# Patient Record
Sex: FEMALE | Race: BLACK OR AFRICAN AMERICAN | NOT HISPANIC OR LATINO
[De-identification: names, ages, dates, MRNs, and addresses within clinical notes are randomized per-mention and may not be internally consistent; named-entity substitution may affect disease eponyms.]

---

## 2017-04-07 ENCOUNTER — APPOINTMENT (OUTPATIENT)
Dept: INTERNAL MEDICINE | Facility: CLINIC | Age: 82
End: 2017-04-07

## 2017-04-12 ENCOUNTER — APPOINTMENT (OUTPATIENT)
Dept: INTERNAL MEDICINE | Facility: CLINIC | Age: 82
End: 2017-04-12

## 2017-04-12 ENCOUNTER — RX RENEWAL (OUTPATIENT)
Age: 82
End: 2017-04-12

## 2017-04-12 VITALS — DIASTOLIC BLOOD PRESSURE: 96 MMHG | SYSTOLIC BLOOD PRESSURE: 162 MMHG | RESPIRATION RATE: 12 BRPM | HEART RATE: 80 BPM

## 2017-04-12 VITALS — WEIGHT: 131 LBS | HEIGHT: 65.5 IN | BODY MASS INDEX: 21.56 KG/M2

## 2017-04-12 VITALS — SYSTOLIC BLOOD PRESSURE: 160 MMHG | DIASTOLIC BLOOD PRESSURE: 96 MMHG

## 2017-04-12 DIAGNOSIS — R60.0 LOCALIZED EDEMA: ICD-10-CM

## 2017-04-12 DIAGNOSIS — L30.9 DERMATITIS, UNSPECIFIED: ICD-10-CM

## 2017-04-12 DIAGNOSIS — L29.9 PRURITUS, UNSPECIFIED: ICD-10-CM

## 2017-04-12 DIAGNOSIS — I11.9 HYPERTENSIVE HEART DISEASE W/OUT HEART FAILURE: ICD-10-CM

## 2017-04-12 DIAGNOSIS — Z00.00 ENCOUNTER FOR GENERAL ADULT MEDICAL EXAMINATION W/OUT ABNORMAL FINDINGS: ICD-10-CM

## 2017-04-12 DIAGNOSIS — Z80.3 FAMILY HISTORY OF MALIGNANT NEOPLASM OF BREAST: ICD-10-CM

## 2017-04-12 DIAGNOSIS — I48.91 UNSPECIFIED ATRIAL FIBRILLATION: ICD-10-CM

## 2017-04-12 DIAGNOSIS — I50.9 HEART FAILURE, UNSPECIFIED: ICD-10-CM

## 2017-04-12 RX ORDER — ASPIRIN ENTERIC COATED TABLETS 81 MG 81 MG/1
81 TABLET, DELAYED RELEASE ORAL DAILY
Qty: 30 | Refills: 0 | Status: ACTIVE | COMMUNITY
Start: 2017-04-12

## 2017-04-12 RX ORDER — CARVEDILOL 6.25 MG/1
6.25 TABLET, FILM COATED ORAL TWICE DAILY
Qty: 180 | Refills: 3 | Status: ACTIVE | COMMUNITY
Start: 2017-03-13

## 2017-04-12 RX ORDER — RAMIPRIL 1.25 MG/1
1.25 CAPSULE ORAL DAILY
Qty: 90 | Refills: 0 | Status: ACTIVE | COMMUNITY

## 2017-04-13 ENCOUNTER — RX RENEWAL (OUTPATIENT)
Age: 82
End: 2017-04-13

## 2017-04-13 LAB
ALBUMIN SERPL ELPH-MCNC: 4.1 G/DL
ALP BLD-CCNC: 67 U/L
ALT SERPL-CCNC: 10 U/L
ANION GAP SERPL CALC-SCNC: 15 MMOL/L
AST SERPL-CCNC: 20 U/L
BASOPHILS # BLD AUTO: 0.05 K/UL
BASOPHILS NFR BLD AUTO: 1 %
BILIRUB DIRECT SERPL-MCNC: 0.1 MG/DL
BILIRUB INDIRECT SERPL-MCNC: 0.2 MG/DL
BILIRUB SERPL-MCNC: 0.2 MG/DL
BUN SERPL-MCNC: 17 MG/DL
CALCIUM SERPL-MCNC: 9.4 MG/DL
CHLORIDE SERPL-SCNC: 104 MMOL/L
CHOLEST SERPL-MCNC: 181 MG/DL
CHOLEST/HDLC SERPL: 2.8 RATIO
CO2 SERPL-SCNC: 22 MMOL/L
CREAT SERPL-MCNC: 0.76 MG/DL
EOSINOPHIL # BLD AUTO: 0.39 K/UL
EOSINOPHIL NFR BLD AUTO: 7.9 %
GLUCOSE SERPL-MCNC: 83 MG/DL
HBA1C MFR BLD HPLC: 6.1 %
HCT VFR BLD CALC: 36.6 %
HDLC SERPL-MCNC: 65 MG/DL
HGB BLD-MCNC: 11.1 G/DL
IMM GRANULOCYTES NFR BLD AUTO: 0.2 %
INR PPP: 1.1 RATIO
LDLC SERPL CALC-MCNC: 103 MG/DL
LYMPHOCYTES # BLD AUTO: 1.61 K/UL
LYMPHOCYTES NFR BLD AUTO: 32.7 %
MAN DIFF?: NORMAL
MCHC RBC-ENTMCNC: 27.2 PG
MCHC RBC-ENTMCNC: 30.3 GM/DL
MCV RBC AUTO: 89.7 FL
MONOCYTES # BLD AUTO: 0.4 K/UL
MONOCYTES NFR BLD AUTO: 8.1 %
NEUTROPHILS # BLD AUTO: 2.47 K/UL
NEUTROPHILS NFR BLD AUTO: 50.1 %
PLATELET # BLD AUTO: 244 K/UL
POTASSIUM SERPL-SCNC: 4.4 MMOL/L
PROT SERPL-MCNC: 7.8 G/DL
PT BLD: 12.4 SEC
RBC # BLD: 4.08 M/UL
RBC # FLD: 16.2 %
SODIUM SERPL-SCNC: 141 MMOL/L
TRIGL SERPL-MCNC: 64 MG/DL
TSH SERPL-ACNC: 4.48 UIU/ML
WBC # FLD AUTO: 4.93 K/UL

## 2017-04-13 RX ORDER — WARFARIN 2 MG/1
2 TABLET ORAL DAILY
Qty: 45 | Refills: 0 | Status: ACTIVE | COMMUNITY
Start: 2017-04-12

## 2017-07-28 ENCOUNTER — APPOINTMENT (OUTPATIENT)
Dept: INTERNAL MEDICINE | Facility: CLINIC | Age: 82
End: 2017-07-28

## 2018-04-23 ENCOUNTER — RX RENEWAL (OUTPATIENT)
Age: 83
End: 2018-04-23

## 2018-04-23 RX ORDER — FUROSEMIDE 40 MG/1
40 TABLET ORAL
Qty: 90 | Refills: 0 | Status: ACTIVE | COMMUNITY
Start: 2017-04-12 | End: 1900-01-01

## 2018-08-08 ENCOUNTER — INPATIENT (INPATIENT)
Facility: HOSPITAL | Age: 83
LOS: 2 days | Discharge: HOME | End: 2018-08-11
Attending: INTERNAL MEDICINE | Admitting: INTERNAL MEDICINE
Payer: COMMERCIAL

## 2018-08-08 VITALS
DIASTOLIC BLOOD PRESSURE: 85 MMHG | RESPIRATION RATE: 18 BRPM | HEART RATE: 79 BPM | OXYGEN SATURATION: 100 % | SYSTOLIC BLOOD PRESSURE: 147 MMHG | TEMPERATURE: 97 F

## 2018-08-08 LAB
ALBUMIN SERPL ELPH-MCNC: 3.7 G/DL — SIGNIFICANT CHANGE UP (ref 3.3–5)
ALP SERPL-CCNC: 72 U/L — SIGNIFICANT CHANGE UP (ref 40–120)
ALT FLD-CCNC: 20 U/L — SIGNIFICANT CHANGE UP (ref 12–78)
AMYLASE P1 CFR SERPL: 80 U/L — SIGNIFICANT CHANGE UP (ref 25–115)
ANION GAP SERPL CALC-SCNC: 9 MMOL/L — SIGNIFICANT CHANGE UP (ref 5–17)
APPEARANCE UR: CLEAR — SIGNIFICANT CHANGE UP
APTT BLD: 33.1 SEC — SIGNIFICANT CHANGE UP (ref 27.5–37.4)
AST SERPL-CCNC: 25 U/L — SIGNIFICANT CHANGE UP (ref 15–37)
BASOPHILS # BLD AUTO: 0.06 K/UL — SIGNIFICANT CHANGE UP (ref 0–0.2)
BASOPHILS NFR BLD AUTO: 0.9 % — SIGNIFICANT CHANGE UP (ref 0–2)
BILIRUB SERPL-MCNC: 0.3 MG/DL — SIGNIFICANT CHANGE UP (ref 0.2–1.2)
BILIRUB UR-MCNC: NEGATIVE — SIGNIFICANT CHANGE UP
BUN SERPL-MCNC: 20 MG/DL — SIGNIFICANT CHANGE UP (ref 7–23)
CALCIUM SERPL-MCNC: 8.8 MG/DL — SIGNIFICANT CHANGE UP (ref 8.5–10.1)
CHLORIDE SERPL-SCNC: 105 MMOL/L — SIGNIFICANT CHANGE UP (ref 96–108)
CK MB BLD-MCNC: 0.8 % — SIGNIFICANT CHANGE UP (ref 0–3.5)
CK MB CFR SERPL CALC: 1.4 NG/ML — SIGNIFICANT CHANGE UP (ref 0.5–3.6)
CK SERPL-CCNC: 174 U/L — SIGNIFICANT CHANGE UP (ref 26–192)
CO2 SERPL-SCNC: 26 MMOL/L — SIGNIFICANT CHANGE UP (ref 22–31)
COLOR SPEC: YELLOW — SIGNIFICANT CHANGE UP
CREAT SERPL-MCNC: 0.99 MG/DL — SIGNIFICANT CHANGE UP (ref 0.5–1.3)
DIFF PNL FLD: NEGATIVE — SIGNIFICANT CHANGE UP
EOSINOPHIL # BLD AUTO: 0.25 K/UL — SIGNIFICANT CHANGE UP (ref 0–0.5)
EOSINOPHIL NFR BLD AUTO: 3.9 % — SIGNIFICANT CHANGE UP (ref 0–6)
GLUCOSE SERPL-MCNC: 104 MG/DL — HIGH (ref 70–99)
GLUCOSE UR QL: NEGATIVE MG/DL — SIGNIFICANT CHANGE UP
HCT VFR BLD CALC: 36.3 % — SIGNIFICANT CHANGE UP (ref 34.5–45)
HGB BLD-MCNC: 11.1 G/DL — LOW (ref 11.5–15.5)
IMM GRANULOCYTES NFR BLD AUTO: 0.3 % — SIGNIFICANT CHANGE UP (ref 0–1.5)
INR BLD: 1.18 RATIO — HIGH (ref 0.88–1.16)
KETONES UR-MCNC: NEGATIVE — SIGNIFICANT CHANGE UP
LEUKOCYTE ESTERASE UR-ACNC: ABNORMAL
LIDOCAIN IGE QN: 235 U/L — SIGNIFICANT CHANGE UP (ref 73–393)
LYMPHOCYTES # BLD AUTO: 2.27 K/UL — SIGNIFICANT CHANGE UP (ref 1–3.3)
LYMPHOCYTES # BLD AUTO: 35.1 % — SIGNIFICANT CHANGE UP (ref 13–44)
MCHC RBC-ENTMCNC: 27.5 PG — SIGNIFICANT CHANGE UP (ref 27–34)
MCHC RBC-ENTMCNC: 30.6 GM/DL — LOW (ref 32–36)
MCV RBC AUTO: 90.1 FL — SIGNIFICANT CHANGE UP (ref 80–100)
MONOCYTES # BLD AUTO: 0.74 K/UL — SIGNIFICANT CHANGE UP (ref 0–0.9)
MONOCYTES NFR BLD AUTO: 11.4 % — SIGNIFICANT CHANGE UP (ref 2–14)
NEUTROPHILS # BLD AUTO: 3.13 K/UL — SIGNIFICANT CHANGE UP (ref 1.8–7.4)
NEUTROPHILS NFR BLD AUTO: 48.4 % — SIGNIFICANT CHANGE UP (ref 43–77)
NITRITE UR-MCNC: NEGATIVE — SIGNIFICANT CHANGE UP
NRBC # BLD: 0 /100 WBCS — SIGNIFICANT CHANGE UP (ref 0–0)
NT-PROBNP SERPL-SCNC: 206 PG/ML — SIGNIFICANT CHANGE UP (ref 0–450)
PH UR: 5 — SIGNIFICANT CHANGE UP (ref 5–8)
PLATELET # BLD AUTO: 228 K/UL — SIGNIFICANT CHANGE UP (ref 150–400)
POTASSIUM SERPL-MCNC: 4.5 MMOL/L — SIGNIFICANT CHANGE UP (ref 3.5–5.3)
POTASSIUM SERPL-SCNC: 4.5 MMOL/L — SIGNIFICANT CHANGE UP (ref 3.5–5.3)
PROT SERPL-MCNC: 8.5 GM/DL — HIGH (ref 6–8.3)
PROT UR-MCNC: NEGATIVE MG/DL — SIGNIFICANT CHANGE UP
PROTHROM AB SERPL-ACNC: 12.9 SEC — HIGH (ref 9.8–12.7)
RBC # BLD: 4.03 M/UL — SIGNIFICANT CHANGE UP (ref 3.8–5.2)
RBC # FLD: 14.4 % — SIGNIFICANT CHANGE UP (ref 10.3–14.5)
SODIUM SERPL-SCNC: 140 MMOL/L — SIGNIFICANT CHANGE UP (ref 135–145)
SP GR SPEC: 1.01 — SIGNIFICANT CHANGE UP (ref 1.01–1.02)
TROPONIN I SERPL-MCNC: <.015 NG/ML — SIGNIFICANT CHANGE UP (ref 0.01–0.04)
UROBILINOGEN FLD QL: NEGATIVE MG/DL — SIGNIFICANT CHANGE UP
WBC # BLD: 6.47 K/UL — SIGNIFICANT CHANGE UP (ref 3.8–10.5)
WBC # FLD AUTO: 6.47 K/UL — SIGNIFICANT CHANGE UP (ref 3.8–10.5)

## 2018-08-08 PROCEDURE — 71045 X-RAY EXAM CHEST 1 VIEW: CPT | Mod: 26

## 2018-08-08 PROCEDURE — 99285 EMERGENCY DEPT VISIT HI MDM: CPT

## 2018-08-08 NOTE — ED PROVIDER NOTE - CARE PLAN
Principal Discharge DX:	Dizziness  Secondary Diagnosis:	UTI (urinary tract infection)  Secondary Diagnosis:	Elevated troponin

## 2018-08-08 NOTE — ED PROVIDER NOTE - MEDICAL DECISION MAKING DETAILS
Patient presents for intermittent dizziness and lightheadedness over the last 2 days.  VSS.  Lab values reviewed. Serial enzymes trended, second set is elevated, however no EKG changes or chest pain.  Discussed use of asper-lidocaine cream with Poison Control, no further recommendations if salicylate is negative.  Lidocaine level pending to assess toxicity.  +UTI which is being treated.  Will need cards consult, Dr. Gordon to be paged in the morning. Patient is to be admitted to the hospital and the case was discussed with the admitting physician.  Any changes in plan, additional imaging/labs, and further work up will be at the discretion of the admitting physician.

## 2018-08-08 NOTE — ED PROVIDER NOTE - OBJECTIVE STATEMENT
Pertinent PMH/PSH/FHx/SHx and Review of Systems contained within:  Patient presents to the ED for generalized weakness and near syncope.  Patient is a difficult and limited historian, no family present at bedside.  Has extensive cardiac history, reports feeling weak and feels like shes going to pass out over the last few days.  Feels short of breath, dizzy at time, feels a ringing in her ears.  Reports chest and abdominal discomfort.  Has a pacemaker/defibrillator, denies any shocks, but says that the last time she felt this way there was something wrong with her pacemaker. Patient has been using asperlidocane cream for the last 2 days for lower extremities, is unsure if it is related to her symptoms.     Relevant PMHx/SHx/SOCHx/FAMH:  HTN, atrial fib, CHF, aortic valve replacement  Patient denies EtOH/tobacco/illicit substance use.  Cardiologist: Dr. Gordon    ROS: No fever/chills, No headache/photophobia/eye pain/changes in vision, No ear pain/sore throat/dysphagia, No palpitations, no cough/wheeze/stridor, No N/V/D/melena, no dysuria/frequency/discharge, No neck/back pain, no rash, no changes in neurological status/function. Pertinent PMH/PSH/FHx/SHx and Review of Systems contained within:  Patient presents to the ED for generalized weakness and near syncope.  Patient is a difficult and limited historian, no family present at bedside.  Has extensive cardiac history, reports feeling weak and feels like shes going to pass out over the last few days.  Feels short of breath, dizzy at time, feels a ringing in her ears.  Denies chest or abdominal discomfort, has vague sensation of something pulsing in her abdomen.  Has a pacemaker/defibrillator, denies any shocks, but says that the last time she felt this way there was something wrong with her pacemaker. Patient has been using asperlidocane cream for the last 2 days for lower extremities, is unsure if it is related to her symptoms.     Relevant PMHx/SHx/SOCHx/FAMH:  HTN, atrial fib, CHF, aortic valve replacement  Patient denies EtOH/tobacco/illicit substance use.  Cardiologist: Dr. Gordon    ROS: No fever/chills, No headache/photophobia/eye pain/changes in vision, No ear pain/sore throat/dysphagia, No palpitations, no cough/wheeze/stridor, No N/V/D/melena, no dysuria/frequency/discharge, No neck/back pain, no rash, no changes in neurological status/function.

## 2018-08-08 NOTE — ED PROVIDER NOTE - PHYSICAL EXAMINATION
Gen: Alert, mild distress, well appearing  Head: NC, AT, PERRL, EOMI, normal lids/conjunctiva  ENT: normal hearing, patent oropharynx without erythema/exudate, uvula midline  Neck: +supple, no tenderness/meningismus/JVD, +Trachea midline  Pulm: Bilateral BS, normal resp effort, no wheeze/stridor/retractions, thoracic and abdominal surgical scars  CV: RRR, no M/R/G, +dist pulses  Abd: soft, NT/ND, +BS, mild pulsating mass  Mskel: no edema/erythema/cyanosis  Skin: no rash, warm/dry  Neuro: AAOx3, no apparent sensory/motor deficits, coordination intact

## 2018-08-09 DIAGNOSIS — R74.8 ABNORMAL LEVELS OF OTHER SERUM ENZYMES: ICD-10-CM

## 2018-08-09 DIAGNOSIS — I48.2 CHRONIC ATRIAL FIBRILLATION: ICD-10-CM

## 2018-08-09 DIAGNOSIS — R42 DIZZINESS AND GIDDINESS: ICD-10-CM

## 2018-08-09 DIAGNOSIS — I10 ESSENTIAL (PRIMARY) HYPERTENSION: ICD-10-CM

## 2018-08-09 DIAGNOSIS — N39.0 URINARY TRACT INFECTION, SITE NOT SPECIFIED: ICD-10-CM

## 2018-08-09 DIAGNOSIS — R35.0 FREQUENCY OF MICTURITION: ICD-10-CM

## 2018-08-09 DIAGNOSIS — Z95.2 PRESENCE OF PROSTHETIC HEART VALVE: Chronic | ICD-10-CM

## 2018-08-09 LAB
ANION GAP SERPL CALC-SCNC: 7 MMOL/L — SIGNIFICANT CHANGE UP (ref 5–17)
APAP SERPL-MCNC: < 2 UG/ML (ref 10–30)
BASOPHILS # BLD AUTO: 0.04 K/UL — SIGNIFICANT CHANGE UP (ref 0–0.2)
BASOPHILS NFR BLD AUTO: 0.7 % — SIGNIFICANT CHANGE UP (ref 0–2)
BUN SERPL-MCNC: 16 MG/DL — SIGNIFICANT CHANGE UP (ref 7–23)
CALCIUM SERPL-MCNC: 9 MG/DL — SIGNIFICANT CHANGE UP (ref 8.5–10.1)
CHLORIDE SERPL-SCNC: 109 MMOL/L — HIGH (ref 96–108)
CK MB BLD-MCNC: 1.2 % — SIGNIFICANT CHANGE UP (ref 0–3.5)
CK MB CFR SERPL CALC: 1.8 NG/ML — SIGNIFICANT CHANGE UP (ref 0.5–3.6)
CK MB CFR SERPL CALC: 2.6 NG/ML — SIGNIFICANT CHANGE UP (ref 0.5–3.6)
CK SERPL-CCNC: 152 U/L — SIGNIFICANT CHANGE UP (ref 26–192)
CO2 SERPL-SCNC: 26 MMOL/L — SIGNIFICANT CHANGE UP (ref 22–31)
CREAT SERPL-MCNC: 0.72 MG/DL — SIGNIFICANT CHANGE UP (ref 0.5–1.3)
EOSINOPHIL # BLD AUTO: 0.16 K/UL — SIGNIFICANT CHANGE UP (ref 0–0.5)
EOSINOPHIL NFR BLD AUTO: 2.9 % — SIGNIFICANT CHANGE UP (ref 0–6)
GLUCOSE BLDC GLUCOMTR-MCNC: 89 MG/DL — SIGNIFICANT CHANGE UP (ref 70–99)
GLUCOSE SERPL-MCNC: 98 MG/DL — SIGNIFICANT CHANGE UP (ref 70–99)
HBA1C BLD-MCNC: 5.9 % — HIGH (ref 4–5.6)
HCT VFR BLD CALC: 35.7 % — SIGNIFICANT CHANGE UP (ref 34.5–45)
HGB BLD-MCNC: 11.1 G/DL — LOW (ref 11.5–15.5)
IMM GRANULOCYTES NFR BLD AUTO: 0.2 % — SIGNIFICANT CHANGE UP (ref 0–1.5)
LYMPHOCYTES # BLD AUTO: 2.01 K/UL — SIGNIFICANT CHANGE UP (ref 1–3.3)
LYMPHOCYTES # BLD AUTO: 36.5 % — SIGNIFICANT CHANGE UP (ref 13–44)
MCHC RBC-ENTMCNC: 27.2 PG — SIGNIFICANT CHANGE UP (ref 27–34)
MCHC RBC-ENTMCNC: 31.1 GM/DL — LOW (ref 32–36)
MCV RBC AUTO: 87.5 FL — SIGNIFICANT CHANGE UP (ref 80–100)
MONOCYTES # BLD AUTO: 0.65 K/UL — SIGNIFICANT CHANGE UP (ref 0–0.9)
MONOCYTES NFR BLD AUTO: 11.8 % — SIGNIFICANT CHANGE UP (ref 2–14)
NEUTROPHILS # BLD AUTO: 2.63 K/UL — SIGNIFICANT CHANGE UP (ref 1.8–7.4)
NEUTROPHILS NFR BLD AUTO: 47.9 % — SIGNIFICANT CHANGE UP (ref 43–77)
NRBC # BLD: 0 /100 WBCS — SIGNIFICANT CHANGE UP (ref 0–0)
PLATELET # BLD AUTO: 233 K/UL — SIGNIFICANT CHANGE UP (ref 150–400)
POTASSIUM SERPL-MCNC: 3.9 MMOL/L — SIGNIFICANT CHANGE UP (ref 3.5–5.3)
POTASSIUM SERPL-SCNC: 3.9 MMOL/L — SIGNIFICANT CHANGE UP (ref 3.5–5.3)
RBC # BLD: 4.08 M/UL — SIGNIFICANT CHANGE UP (ref 3.8–5.2)
RBC # FLD: 14.2 % — SIGNIFICANT CHANGE UP (ref 10.3–14.5)
SALICYLATES SERPL-MCNC: <1.7 MG/DL — LOW (ref 2.8–20)
SODIUM SERPL-SCNC: 142 MMOL/L — SIGNIFICANT CHANGE UP (ref 135–145)
TROPONIN I SERPL-MCNC: 0.27 NG/ML — HIGH (ref 0.01–0.04)
TROPONIN I SERPL-MCNC: 0.3 NG/ML — HIGH (ref 0.01–0.04)
TROPONIN I SERPL-MCNC: 0.37 NG/ML — HIGH (ref 0.01–0.04)
TSH SERPL-MCNC: 3.9 UIU/ML — HIGH (ref 0.36–3.74)
WBC # BLD: 5.5 K/UL — SIGNIFICANT CHANGE UP (ref 3.8–10.5)
WBC # FLD AUTO: 5.5 K/UL — SIGNIFICANT CHANGE UP (ref 3.8–10.5)
WBC UR QL: ABNORMAL

## 2018-08-09 PROCEDURE — 74175 CTA ABDOMEN W/CONTRAST: CPT | Mod: 26

## 2018-08-09 PROCEDURE — 70450 CT HEAD/BRAIN W/O DYE: CPT | Mod: 26

## 2018-08-09 PROCEDURE — 71275 CT ANGIOGRAPHY CHEST: CPT | Mod: 26

## 2018-08-09 PROCEDURE — 93306 TTE W/DOPPLER COMPLETE: CPT | Mod: 26

## 2018-08-09 PROCEDURE — 99223 1ST HOSP IP/OBS HIGH 75: CPT

## 2018-08-09 PROCEDURE — 93010 ELECTROCARDIOGRAM REPORT: CPT

## 2018-08-09 RX ORDER — CIPROFLOXACIN LACTATE 400MG/40ML
500 VIAL (ML) INTRAVENOUS ONCE
Refills: 0 | Status: COMPLETED | OUTPATIENT
Start: 2018-08-09 | End: 2018-08-09

## 2018-08-09 RX ORDER — ATORVASTATIN CALCIUM 80 MG/1
20 TABLET, FILM COATED ORAL AT BEDTIME
Refills: 0 | Status: DISCONTINUED | OUTPATIENT
Start: 2018-08-09 | End: 2018-08-11

## 2018-08-09 RX ORDER — ENOXAPARIN SODIUM 100 MG/ML
40 INJECTION SUBCUTANEOUS DAILY
Refills: 0 | Status: DISCONTINUED | OUTPATIENT
Start: 2018-08-09 | End: 2018-08-11

## 2018-08-09 RX ORDER — LISINOPRIL 2.5 MG/1
5 TABLET ORAL DAILY
Refills: 0 | Status: DISCONTINUED | OUTPATIENT
Start: 2018-08-09 | End: 2018-08-10

## 2018-08-09 RX ORDER — ASPIRIN/CALCIUM CARB/MAGNESIUM 324 MG
81 TABLET ORAL DAILY
Refills: 0 | Status: DISCONTINUED | OUTPATIENT
Start: 2018-08-09 | End: 2018-08-11

## 2018-08-09 RX ORDER — CARVEDILOL PHOSPHATE 80 MG/1
6.25 CAPSULE, EXTENDED RELEASE ORAL EVERY 12 HOURS
Refills: 0 | Status: DISCONTINUED | OUTPATIENT
Start: 2018-08-09 | End: 2018-08-11

## 2018-08-09 RX ORDER — CARVEDILOL PHOSPHATE 80 MG/1
12.5 CAPSULE, EXTENDED RELEASE ORAL EVERY 12 HOURS
Refills: 0 | Status: DISCONTINUED | OUTPATIENT
Start: 2018-08-09 | End: 2018-08-09

## 2018-08-09 RX ADMIN — ENOXAPARIN SODIUM 40 MILLIGRAM(S): 100 INJECTION SUBCUTANEOUS at 11:56

## 2018-08-09 RX ADMIN — Medication 81 MILLIGRAM(S): at 11:56

## 2018-08-09 RX ADMIN — ATORVASTATIN CALCIUM 20 MILLIGRAM(S): 80 TABLET, FILM COATED ORAL at 22:05

## 2018-08-09 RX ADMIN — Medication 500 MILLIGRAM(S): at 03:15

## 2018-08-09 RX ADMIN — CARVEDILOL PHOSPHATE 6.25 MILLIGRAM(S): 80 CAPSULE, EXTENDED RELEASE ORAL at 17:35

## 2018-08-09 NOTE — ED ADULT NURSE NOTE - NSIMPLEMENTINTERV_GEN_ALL_ED
Implemented All Fall Risk Interventions:  [place] to call system. Call bell, personal items and telephone within reach. Instruct patient to call for assistance. Room bathroom lighting operational. Non-slip footwear when patient is off stretcher. Physically safe environment: no spills, clutter or unnecessary equipment. Stretcher in lowest position, wheels locked, appropriate side rails in place. Provide visual cue, wrist band, yellow gown, etc. Monitor gait and stability. Monitor for mental status changes and reorient to person, place, and time. Review medications for side effects contributing to fall risk. Reinforce activity limits and safety measures with patient and family.

## 2018-08-09 NOTE — PROGRESS NOTE ADULT - SUBJECTIVE AND OBJECTIVE BOX
Patient is a 84y old  Female who presents with a chief complaint of Near Syncope. (09 Aug 2018 04:47)    INTERVAL HPI/OVERNIGHT EVENTS:  Pt was seen and examined at bedside.  RRT was called this am after synopsized for few seconds, back to baseline rt after incidence. initial work up negative,    MEDICATIONS  (STANDING):  aspirin enteric coated 81 milliGRAM(s) Oral daily  atorvastatin 20 milliGRAM(s) Oral at bedtime  carvedilol 6.25 milliGRAM(s) Oral every 12 hours  enoxaparin Injectable 40 milliGRAM(s) SubCutaneous daily  levoFLOXacin IVPB      lisinopril 5 milliGRAM(s) Oral daily    MEDICATIONS  (PRN):      Allergies    penicillins (Anaphylaxis)    Vital Signs Last 24 Hrs  T(C): 36.7 (09 Aug 2018 11:49), Max: 36.7 (09 Aug 2018 11:49)  T(F): 98 (09 Aug 2018 11:49), Max: 98 (09 Aug 2018 11:49)  HR: 91 (09 Aug 2018 11:49) (70 - 94)  BP: 133/79 (09 Aug 2018 11:49) (128/84 - 147/85)  BP(mean): --  RR: 17 (09 Aug 2018 11:49) (17 - 18)  SpO2: 100% (09 Aug 2018 11:49) (97% - 100%)    PHYSICAL EXAM:  GENERAL: NAD  HEAD:  Atraumatic, Normocephalic  EYES: EOMI, PERRLA, conjunctiva and sclera clear  ENMT: No tonsillar erythema, exudates, or enlargement; Moist mucous membranes, Good dentition, No lesions  NECK: Supple, No JVD, Normal thyroid  NERVOUS SYSTEM:  Alert & Oriented X3, Good concentration; non focal.  CHEST/LUNG: Clear to percussion bilaterally; No rales, rhonchi, wheezing, or rubs  HEART: Regular rate and rhythm; No murmurs, rubs, or gallops  ABDOMEN: Soft, Nontender, Nondistended; Bowel sounds present  EXTREMITIES:  2+ Peripheral Pulses, No clubbing, cyanosis, or edema  LYMPH: No lymphadenopathy noted  SKIN: No rashes or lesions    LABS:                        11.1   5.50  )-----------( 233      ( 09 Aug 2018 07:29 )             35.7     08-    142  |  109<H>  |  16  ----------------------------<  98  3.9   |  26  |  0.72    Ca    9.0      09 Aug 2018 07:29    TPro  8.5<H>  /  Alb  3.7  /  TBili  0.3  /  DBili  x   /  AST  25  /  ALT  20  /  AlkPhos  72  08-08    PT/INR - ( 08 Aug 2018 23:24 )   PT: 12.9 sec;   INR: 1.18 ratio         PTT - ( 08 Aug 2018 23:24 )  PTT:33.1 sec  Urinalysis Basic - ( 08 Aug 2018 23:53 )    Color: Yellow / Appearance: Clear / S.015 / pH: x  Gluc: x / Ketone: Negative  / Bili: Negative / Urobili: Negative mg/dL   Blood: x / Protein: Negative mg/dL / Nitrite: Negative   Leuk Esterase: Moderate / RBC: x / WBC 11-25   Sq Epi: x / Non Sq Epi: x / Bacteria: x      CAPILLARY BLOOD GLUCOSE      POCT Blood Glucose.: 89 mg/dL (09 Aug 2018 08:46)      RADIOLOGY & ADDITIONAL TESTS:    Imaging Personally Reviewed:  [ ] YES  [ ] NO    Consultant(s) Notes Reviewed:  [ ] YES  [ ] NO    Care Discussed with Consultants/Other Providers [ ] YES  [ ] NO

## 2018-08-09 NOTE — PATIENT PROFILE ADULT. - VISION (WITH CORRECTIVE LENSES IF THE PATIENT USUALLY WEARS THEM):
glasses need to be redone awaiting 2nd cataract surgery/Normal vision: sees adequately in most situations; can see medication labels, newsprint

## 2018-08-09 NOTE — H&P ADULT - PROBLEM SELECTOR PLAN 2
monitor telemetry, serial cardiac enzymes, ASA, anticoagulation, beta blocker  echo cardiology consult

## 2018-08-09 NOTE — CONSULT NOTE ADULT - ASSESSMENT
83 yo AA female with recurrent, witnessed syncopal episodes.  H/O chr AF, PPM/ICD in 2014, s/p MVR, no CAD, HFpEF in past.

## 2018-08-09 NOTE — H&P ADULT - ASSESSMENT
84y Female PMH HTN, atrial fib, CHF, aortic valve replacement; patient presents to the ED for generalized weakness and near syncope. Symptoms have currently resolved, pt has no chest pain, but 2nd troponin elevated, EKG shows paced rhythm.  Will admit and trend troponins; symptoms possibly related to absorption of cream; she has UTI.  IMPROVE VTE Individual Risk Assessment          RISK                                                          Points    [  ] Previous VTE                                                3    [  ] Thrombophilia                                             2    [  ] Lower limb paralysis                                    2        (unable to hold up >15 seconds)      [  ] Current Cancer                                             2         (within 6 months)    [  ] Immobilization > 24 hrs                              1    [  ] ICU/CCU stay > 24 hours                            1    [ x ] Age > 60                                                    1    IMPROVE VTE Score ___1______

## 2018-08-09 NOTE — CONSULT NOTE ADULT - SUBJECTIVE AND OBJECTIVE BOX
CARDIOLOGY CONSULT NOTE    08-09-18 @ 09:47  JOELLEN QUIROS is a 84y Female with a known history of HTN, h/o atrial fib (not on a/c as per patient at the direction of Dr. Gordon, her Cardiologist), h/o HFpEF, s/p MV repair at [facility] in 2014, s/p PPm at [facility] in 2014 (Dr. Kearns). As per patient no history of CAD, cath done prior to valve repair at [facility] was reportedly normal.  Patient presents to the ED for generalized weakness and near syncope. Has extensive cardiac history, reports feeling weak and feels like shes going to pass out over the last few days.  Feels short of breath, lightheaded at times, feels a mild abdominal discomfort prior to each event. Patient has been using asper/lidocane cream for the last 2 days for lower extremities itching which has been chronic, is unsure if it is related to her symptoms. Also significant nocturia, weight loss described.      REVIEW OF SYSTEMS:    CONSTITUTIONAL: No fever, weight loss, or fatigue  EYES: No eye pain, visual disturbances, or discharge  ENMT:  No difficulty hearing, tinnitus, vertigo; No sinus or throat pain  NECK: No pain or stiffness  BREASTS: No pain, masses, or nipple discharge  RESPIRATORY: No cough, wheezing, chills or hemoptysis; +shortness of breath unrelated to effort  CARDIOVASCULAR: as per HPI  GASTROINTESTINAL: No abdominal or epigastric pain. No nausea, vomiting, or hematemesis; No diarrhea or constipation. No melena or hematochezia.  GENITOURINARY: No dysuria, hematuria, or incontinence  NEUROLOGICAL: No headaches, memory loss, loss of strength, numbness, or tremors  SKIN: No itching, burning, rashes, or lesions   LYMPH NODES: No enlarged glands  ENDOCRINE: No heat or cold intolerance; No hair loss  MUSCULOSKELETAL: No joint pain or swelling; No muscle, back, or extremity pain  PSYCHIATRIC: No depression, anxiety, mood swings, or difficulty sleeping  HEME/LYMPH: No easy bruising, or bleeding gums  ALLERGY AND IMMUNOLOGIC: No hives or eczema    MEDICATIONS  (STANDING):  aspirin enteric coated 81 milliGRAM(s) Oral daily  atorvastatin 20 milliGRAM(s) Oral at bedtime  carvedilol 12.5 milliGRAM(s) Oral every 12 hours  enoxaparin Injectable 40 milliGRAM(s) SubCutaneous daily  levoFLOXacin IVPB        Reconcilliation not done; patient on low dose Ramipril, Coreg 6.25 BID, Lasix only BIW, asa. She denies use of Coumadin.  ALLERGIES: penicillins (Anaphylaxis)      FAMILY HISTORY: FAMILY HISTORY:  No pertinent family history in first degree relatives      PHYSICAL EXAMINATION:  -----------------------------  T(C): 36 (08-09-18 @ 06:58), Max: 36.6 (08-09-18 @ 05:48)  HR: 70 (08-09-18 @ 06:58) (70 - 94)  BP: 130/81 (08-09-18 @ 06:58) (128/84 - 147/85)  RR: 17 (08-09-18 @ 06:58) (17 - 18)  SpO2: 97% (08-09-18 @ 06:58) (97% - 100%)  Wt(kg): --    Height (cm): 165.1 (08-09 @ 05:31)  Weight (kg): 53.1 (08-09 @ 06:58)  BMI (kg/m2): 19.5 (08-09 @ 06:58)  BSA (m2): 1.58 (08-09 @ 06:58)    Constitutional: well developed, normal appearance, well groomed, well nourished, no deformities and no acute distress.   Eyes: the conjunctiva exhibited no abnormalities and the eyelids demonstrated no xanthelasmas.   HEENT: normal oral mucosa, no oral pallor and no oral cyanosis.   Neck: normal jugular venous A waves present, normal jugular venous V waves present and no jugular venous cordon A waves.   Pulmonary: no respiratory distress, normal respiratory rhythm and effort, no accessory muscle use and lungs were clear to auscultation bilaterally.   Cardiovascular: heart rate and rhythm were normal, normal S1 and S2 and no murmur, gallop, rub, heave or thrill are present.   Abdomen: soft, non-tender, no hepato-splenomegaly and no abdominal mass palpated.   Musculoskeletal: the gait could not be assessed..   Extremities: no clubbing of the fingernails, no localized cyanosis, no petechial hemorrhages and no ischemic changes.   Skin: normal skin color and pigmentation, no rash, no venous stasis, no skin lesions, no skin ulcer and no xanthoma was observed.   Psychiatric: oriented to person, place, and time, the affect was normal, the mood was normal and not feeling anxious.     LABS:   --------  08-09    142  |  109<H>  |  16  ----------------------------<  98  3.9   |  26  |  0.72    Ca    9.0      09 Aug 2018 07:29    TPro  8.5<H>  /  Alb  3.7  /  TBili  0.3  /  DBili  x   /  AST  25  /  ALT  20  /  AlkPhos  72  08-08                         11.1   5.50  )-----------( 233      ( 09 Aug 2018 07:29 )             35.7     PT/INR - ( 08 Aug 2018 23:24 )   PT: 12.9 sec;   INR: 1.18 ratio         PTT - ( 08 Aug 2018 23:24 )  PTT:33.1 sec  08-08 @ 23:24 BNP: 206 pg/mL    08-09 @ 09:15 CPK total:--, CKMB --, Troponin I - .369 ng/mL<H>  08-09 @ 03:09 CPK total:--, CKMB --, Troponin I - .267 ng/mL<H>  08-08 @ 23:24 CPK total:--, CKMB --, Troponin I - <.015 ng/mL        CARDIAC MARKERS ( 09 Aug 2018 09:15 )  .369 ng/mL / x     / x     / x     / 2.6 ng/mL  CARDIAC MARKERS ( 09 Aug 2018 03:09 )  .267 ng/mL / x     / 152 U/L / x     / 1.8 ng/mL  CARDIAC MARKERS ( 08 Aug 2018 23:24 )  <.015 ng/mL / x     / 174 U/L / x     / 1.4 ng/mL        RADIOLOGY:  -----------------  < from: CT Angio Abdomen w/ IV Cont (08.09.18 @ 01:18) >    EXAM:  CT ANGIO CHEST (W)AW IC                          EXAM:  CT ANGIO ABDOMEN ONLY IC                            PROCEDURE DATE:  08/09/2018          INTERPRETATION:  INDICATION: Chest and abdominal pain.    TECHNIQUE: Noncontrast transaxial CT images were acquired through the   chest. Subsequently, transaxial images were obtained of the chest,   abdomen, and pelvis using CT angiographic protocol.    Total of 80ml of Omnipaque-350  was administered intravenously without   complication. Sagittal and coronal reformatted images were obtained from   the source data. Axial MIP images were provided.    COMPARISON: None.    FINDINGS:    CT CHEST    There is no evidence of aortic dissection, aortic aneurysm, or mural   hematoma.     The heart is within normal limits. No pericardial effusion. No   intrathoracic adenopathy.    Bilateral interstitial prominence with bibasilar discoid atelectasis   and/or scarring. Biapical pleural-parenchymal thickening and scarring is   evident. No focal parenchymal consolidation. Bibasilar dependent changes.    No pleural effusion or pneumothorax.    Median sternotomy wires are intact. Left-sided pacer device with distal   lead tips in the right atrium and right ventricle.    CT ABDOMEN PELVIS    The celiac artery, SMA, bilateral renal, RAMONE, and bilateral common iliac   arteries are patent. Atheromatous calcification along the aorta is noted.     The liver, spleen, pancreas, and adrenal glands are unremarkable.   Gallbladder is unremarkable.    The kidneys enhance symmetrically without hydronephrosis. Urinary bladder   is mildly distended.    No bowel obstruction or free air.    Calcified uterine fibroids noted.    Advanced multilevel thoracolumbar spondylosis noted. Osteopenia.    IMPRESSION:    No evidence of aortic dissection, aortic aneurysm or mural hematoma.                TASHA BERRY M.D., ATTENDING RADIOLOGIST  This document has been electronically signed. Aug  9 2018  1:55AM                < end of copied text >        ECG: AVPM, underlying AFib

## 2018-08-09 NOTE — H&P ADULT - NSHPLABSRESULTS_GEN_ALL_CORE
LABS:                        11.1   6.47  )-----------( 228      ( 08 Aug 2018 23:24 )             36.3         140  |  105  |  20  ----------------------------<  104<H>  4.5   |  26  |  0.99    Ca    8.8      08 Aug 2018 23:24    TPro  8.5<H>  /  Alb  3.7  /  TBili  0.3  /  DBili  x   /  AST  25  /  ALT  20  /  AlkPhos  72      PT/INR - ( 08 Aug 2018 23:24 )   PT: 12.9 sec;   INR: 1.18 ratio         PTT - ( 08 Aug 2018 23:24 )  PTT:33.1 sec  Urinalysis Basic - ( 08 Aug 2018 23:53 )    Color: Yellow / Appearance: Clear / S.015 / pH: x  Gluc: x / Ketone: Negative  / Bili: Negative / Urobili: Negative mg/dL   Blood: x / Protein: Negative mg/dL / Nitrite: Negative   Leuk Esterase: Moderate / RBC: x / WBC 11-25   Sq Epi: x / Non Sq Epi: x / Bacteria: x      CAPILLARY BLOOD GLUCOSE  Troponin I, Serum: .267: . ng/mL (18 @ 03:09)  Troponin I, Serum: <.015:  ng/mL (18 @ 23:24)  Salicylate Level, Serum: <1.7 mg/dL (18 @ 03:09)            RADIOLOGY & ADDITIONAL TESTS:  < from: CT Angio Abdomen w/ IV Cont (18 @ 01:18) >    There is no evidence of aortic dissection, aortic aneurysm, or mural   hematoma.     The heart is within normal limits. No pericardial effusion. No   intrathoracic adenopathy.    Bilateral interstitial prominence with bibasilar discoid atelectasis   and/or scarring. Biapical pleural-parenchymal thickening and scarring is   evident. No focal parenchymal consolidation. Bibasilar dependent changes.    < from: CT Angio Abdomen w/ IV Cont (18 @ 01:18) >    The celiac artery, SMA, bilateral renal, RAMONE, and bilateral common iliac   arteries are patent. Atheromatous calcification along the aorta is noted.     The liver, spleen, pancreas, and adrenal glands are unremarkable.   Gallbladder is unremarkable.    The kidneys enhance symmetrically without hydronephrosis. Urinary bladder   is mildly distended.    No bowel obstruction or free air.  < from: CT Head No Cont (18 @ 01:18) >    No acute intracranial bleeding, mass effect, or shift.     Volume loss and chronic microvascular ischemic changes.    < end of copied text >      Imaging Personally Reviewed:  [x ] YES  [ ] NO  EKG: paced @70

## 2018-08-09 NOTE — PROGRESS NOTE ADULT - ASSESSMENT
84y Female PMH HTN, atrial fib, CHF, aortic valve replacement; patient presents to the ED for generalized weakness and syncope.    Syncope:  - Paced rhythm on tele  - Pacemaker interrogation done, no acute events.  - Unclear etiology of syncope  - Possibly vaso vagal?  - Awaiting 2D echo  - Cardio following.    Chronic A fib:  - Rate controlled, has pacemaker  - On BB  - Not on AC    Elevated troponin:  - Likely demand ischemic    Suspected UTI:  - Continue antibiotic  - Follow up culture    DVT ppx:  - ON lovenox.

## 2018-08-09 NOTE — CHART NOTE - NSCHARTNOTEFT_GEN_A_CORE
Responded to RRT for syncope . Pt admitted early this morning for syncope and per nursing ( Natan) she synopsized in his arms. FS at 91 vitals stable  and oxygen saturation at 98% on 2 liters . Pt rapidly came to and was able to tell me that she felt a sensation in her stomach that moved to her chest prior to syncope this was the same feeling that occurred her initial syncope PTA.  She has h/o ppm and? defib.  there are elevated trop on admission.    stat ekg obtained shows paced rhythm   Mikey at [facility] and will notify Dr. Gordon ( pts own cardiologist )   will obtain cardiac enzymes and trend ensure on ASA   check lipid hgba1c and tsh and free t4.

## 2018-08-09 NOTE — PHYSICAL THERAPY INITIAL EVALUATION ADULT - LIVES WITH, PROFILE
Pt lives in a house with family. Stairs (+). Independent prior to admission using a cane. Pt owns a rollator.

## 2018-08-09 NOTE — H&P ADULT - HISTORY OF PRESENT ILLNESS
84y Female PMH HTN, atrial fib, CHF, aortic valve replacement; patient presents to the ED for generalized weakness and near syncope.  Patient is a difficult and limited historian, no family present at bedside.  Has extensive cardiac history, reports feeling weak and feels like shes going to pass out over the last few days.  Feels short of breath, dizzy at time, feels a ringing in her ears.  Denies chest or abdominal discomfort, has vague sensation of something pulsing in her abdomen.  Has a pacemaker/defibrillator, denies any shocks, but says that the last time she felt this way there was something wrong with her pacemaker. Patient has been using asperlidocane cream for the last 2 days for lower extremities, is unsure if it is related to her symptoms.

## 2018-08-09 NOTE — ED ADULT NURSE NOTE - OBJECTIVE STATEMENT
Patient complains of feeling faint, patient complains of pulsation in abdomen but no masses palpable, femoral artery bounding. Patient reports using lidocaine cream on legs to soothe itching, used cream for 2 days. Patient reported feeling faint after using lido. Patient reports tinnitus. Denies chest pain but was short of breath at home. Denies falls.

## 2018-08-09 NOTE — H&P ADULT - NSHPREVIEWOFSYSTEMS_GEN_ALL_CORE
ROS: No fever/chills, No headache/photophobia/eye pain/changes in vision, No ear pain/sore throat/dysphagia, No palpitations, no cough/wheeze/stridor, No N/V/D/melena, no dysuria/frequency/discharge, No neck/back pain, no rash, no changes in neurological status/function.

## 2018-08-09 NOTE — H&P ADULT - NSHPPHYSICALEXAM_GEN_ALL_CORE
T(C): 36.4 (09 Aug 2018 04:13), Max: 36.4 (09 Aug 2018 04:13)  T(F): 97.6 (09 Aug 2018 04:13), Max: 97.6 (09 Aug 2018 04:13)  HR: 71 (09 Aug 2018 04:13) (71 - 79)  BP: 128/84 (09 Aug 2018 04:13) (128/84 - 147/85)  BP(mean): --  RR: 17 (09 Aug 2018 04:13) (17 - 18)  SpO2: 100% (09 Aug 2018 04:13) (100% - 100%)    PHYSICAL EXAM:  GENERAL: NAD, well-groomed, well-developed  HEAD:  Atraumatic, Normocephalic  EYES: EOMI, PERRLA, conjunctiva and sclera clear  ENMT: No tonsillar erythema, exudates, or enlargement; Moist mucous membranes, No lesions  NECK: Supple, No JVD, Normal thyroid  NERVOUS SYSTEM:  Alert & Oriented X3, Good concentration; Motor Strength 5/5 B/L upper and lower extremities; DTRs 2+ intact and symmetric  CHEST/LUNG: Clear to percussion bilaterally; No rales, rhonchi, wheezing, or rubs  HEART: Regular rate and rhythm; systolic murmur, no rubs, or gallops  ABDOMEN: Soft, Nontender, Nondistended; Bowel sounds present  EXTREMITIES:  + Peripheral Pulses, No clubbing, cyanosis, or edema  LYMPH: No lymphadenopathy noted  SKIN: No rashes or lesions

## 2018-08-09 NOTE — CONSULT NOTE ADULT - PROBLEM SELECTOR RECOMMENDATION 9
Check for orthostasis, check LVEF, interrogate PPM for rapid AF.  Elevated enzymes likely ischemic demand, doubt significant CAD.

## 2018-08-10 LAB
CHOLEST SERPL-MCNC: 161 MG/DL — SIGNIFICANT CHANGE UP (ref 10–199)
CULTURE RESULTS: SIGNIFICANT CHANGE UP
HDLC SERPL-MCNC: 51 MG/DL — SIGNIFICANT CHANGE UP (ref 40–125)
LIPID PNL WITH DIRECT LDL SERPL: 98 MG/DL — SIGNIFICANT CHANGE UP
SPECIMEN SOURCE: SIGNIFICANT CHANGE UP
T3 SERPL-MCNC: 102 NG/DL — SIGNIFICANT CHANGE UP (ref 80–200)
T4 AB SER-ACNC: 6.6 UG/DL — SIGNIFICANT CHANGE UP (ref 4.6–12)
TOTAL CHOLESTEROL/HDL RATIO MEASUREMENT: 3.2 RATIO — LOW (ref 3.3–7.1)
TRIGL SERPL-MCNC: 62 MG/DL — SIGNIFICANT CHANGE UP (ref 10–149)

## 2018-08-10 PROCEDURE — 99233 SBSQ HOSP IP/OBS HIGH 50: CPT

## 2018-08-10 RX ORDER — LISINOPRIL 2.5 MG/1
2.5 TABLET ORAL DAILY
Refills: 0 | Status: DISCONTINUED | OUTPATIENT
Start: 2018-08-11 | End: 2018-08-11

## 2018-08-10 RX ADMIN — CARVEDILOL PHOSPHATE 6.25 MILLIGRAM(S): 80 CAPSULE, EXTENDED RELEASE ORAL at 05:55

## 2018-08-10 RX ADMIN — Medication 81 MILLIGRAM(S): at 11:35

## 2018-08-10 RX ADMIN — CARVEDILOL PHOSPHATE 6.25 MILLIGRAM(S): 80 CAPSULE, EXTENDED RELEASE ORAL at 17:22

## 2018-08-10 RX ADMIN — LISINOPRIL 5 MILLIGRAM(S): 2.5 TABLET ORAL at 09:47

## 2018-08-10 RX ADMIN — ATORVASTATIN CALCIUM 20 MILLIGRAM(S): 80 TABLET, FILM COATED ORAL at 22:10

## 2018-08-10 RX ADMIN — ENOXAPARIN SODIUM 40 MILLIGRAM(S): 100 INJECTION SUBCUTANEOUS at 11:36

## 2018-08-10 NOTE — PROGRESS NOTE ADULT - SUBJECTIVE AND OBJECTIVE BOX
Patient is a 84y old  Female who presents with a chief complaint of Near Syncope. (09 Aug 2018 04:47)      PAST MEDICAL & SURGICAL HISTORY:  Chronic atrial fibrillation  Hypertension  Mitral valve replaced      INTERVAL HISTORY: resting in bed, not in any acute distress  	  MEDICATIONS:  MEDICATIONS  (STANDING):  aspirin enteric coated 81 milliGRAM(s) Oral daily  atorvastatin 20 milliGRAM(s) Oral at bedtime  carvedilol 6.25 milliGRAM(s) Oral every 12 hours  enoxaparin Injectable 40 milliGRAM(s) SubCutaneous daily  levoFLOXacin IVPB      levoFLOXacin IVPB 250 milliGRAM(s) IV Intermittent every 24 hours  lisinopril 5 milliGRAM(s) Oral daily    MEDICATIONS  (PRN):      Vitals:  T(F): 96.8 (08-10-18 @ 05:26), Max: 98 (08-09-18 @ 16:30)  HR: 91 (08-10-18 @ 05:45) (70 - 91)  BP: 109/69 (08-10-18 @ 05:45) (102/62 - 138/96)  RR: 18 (08-10-18 @ 05:26) (17 - 18)  SpO2: 99% (08-10-18 @ 05:26) (98% - 100%)      08-09 @ 07:01  -  08-10 @ 07:00  --------------------------------------------------------  IN:    Oral Fluid: 360 mL    Solution: 50 mL  Total IN: 410 mL    OUT:  Total OUT: 0 mL    Total NET: 410 mL        Weight (kg): 53.1 (08-09 @ 06:58)  BMI (kg/m2): 19.5 (08-09 @ 06:58)      PHYSICAL EXAM:  Neuro: Awake, responsive  CV: S1 S2 RRR  Lungs: CTABL  GI: Soft, BS +, ND, NT  Extremities: No edema    TELEMETRY: Paced     RADIOLOGY:   < from: CT Angio Chest w/ IV Cont (08.09.18 @ 01:18) >  No evidence of aortic dissection, aortic aneurysm or mural hematoma.    < end of copied text >    < from: CT Head No Cont (08.09.18 @ 01:18) >  No acute intracranial bleeding, mass effect, or shift.     Volume loss and chronic microvascular ischemic changes.    < end of copied text >    DIAGNOSTIC TESTING:    [P] Echocardiogram:      LABS:	 	    CARDIAC MARKERS:  Troponin I, Serum: .296 ng/mL (08-09 @ 15:06)  Troponin I, Serum: .369 ng/mL (08-09 @ 09:15)  Troponin I, Serum: .267 ng/mL (08-09 @ 03:09)  Troponin I, Serum: <.015 ng/mL (08-08 @ 23:24)    09 Aug 2018 07:29    142    |  109    |  16     ----------------------------<  98     3.9     |  26     |  0.72   08 Aug 2018 23:24    140    |  105    |  20     ----------------------------<  104    4.5     |  26     |  0.99     Ca    9.0        09 Aug 2018 07:29    TPro  8.5    /  Alb  3.7    /  TBili  0.3    /  DBili  x      /  AST  25     /  ALT  20     /  AlkPhos  72     08 Aug 2018 23:24                          11.1   5.50  )-----------( 233      ( 09 Aug 2018 07:29 )             35.7 ,                       11.1   6.47  )-----------( 228      ( 08 Aug 2018 23:24 )             36.3   proBNP: Serum Pro-Brain Natriuretic Peptide: 206 pg/mL (08-08 @ 23:24)    Lipid Profile: 08-09 @ 12:16  HDL/Total Cholesterol: --  HDL Chol:              51 mg/dL  Serum Chol:            161 mg/dL  Direct LDL:            98 mg/dL  Triglycerides:         62 mg/dL    HgA1c: Hemoglobin A1C, Whole Blood: 5.9 % (08-09 @ 10:01)    TSH: Thyroid Stimulating Hormone, Serum: 3.900 uIU/mL (08-09 @ 09:15)    INR: 1.18 ratio (08-08 @ 23:24)

## 2018-08-10 NOTE — PROGRESS NOTE ADULT - SUBJECTIVE AND OBJECTIVE BOX
Patient is a 84y old  Female who presents with a chief complaint of Near Syncope. (09 Aug 2018 04:47)    INTERVAL HPI/OVERNIGHT EVENTS:  Pt was seen and examined at bedside, no acute events.    MEDICATIONS  (STANDING):  aspirin enteric coated 81 milliGRAM(s) Oral daily  atorvastatin 20 milliGRAM(s) Oral at bedtime  carvedilol 6.25 milliGRAM(s) Oral every 12 hours  enoxaparin Injectable 40 milliGRAM(s) SubCutaneous daily  levoFLOXacin IVPB      levoFLOXacin IVPB 250 milliGRAM(s) IV Intermittent every 24 hours    MEDICATIONS  (PRN):      Allergies  penicillins (Anaphylaxis)      Vital Signs Last 24 Hrs  T(C): 36.4 (10 Aug 2018 16:35), Max: 36.4 (10 Aug 2018 16:35)  T(F): 97.6 (10 Aug 2018 16:35), Max: 97.6 (10 Aug 2018 16:35)  HR: 71 (10 Aug 2018 17:21) (70 - 91)  BP: 113/65 (10 Aug 2018 17:21) (87/55 - 119/73)  BP(mean): 67 (10 Aug 2018 16:35) (67 - 67)  RR: 18 (10 Aug 2018 16:35) (16 - 18)  SpO2: 100% (10 Aug 2018 16:35) (98% - 100%)    PHYSICAL EXAM:  GENERAL: NAD, well-groomed, well-developed.  HEAD:  Atraumatic, Normocephalic.  EYES: EOMI, PERRLA, conjunctiva and sclera clear.  ENMT: No tonsillar erythema, exudates, or enlargement; Moist mucous membranes, Good dentition, No lesions.  NECK: Supple, No JVD, Normal thyroid  NERVOUS SYSTEM:  Alert & Oriented X3, Good concentration; Motor Strength 5/5 B/L upper and lower extremities; DTRs 2+ intact and symmetric.  CHEST/LUNG: Clear to percussion bilaterally; No rales, rhonchi, wheezing, or rubs  HEART: Regular rate and rhythm; No murmurs, rubs, or gallops.  ABDOMEN: Soft, Nontender, Nondistended; Bowel sounds present.  EXTREMITIES:  2+ Peripheral Pulses, No clubbing, cyanosis, or edema.  LYMPH: No lymphadenopathy noted  SKIN: No rashes or lesions    LABS:                        11.1   5.50  )-----------( 233      ( 09 Aug 2018 07:29 )             35.7     08-    142  |  109<H>  |  16  ----------------------------<  98  3.9   |  26  |  0.72    Ca    9.0      09 Aug 2018 07:29    TPro  8.5<H>  /  Alb  3.7  /  TBili  0.3  /  DBili  x   /  AST  25  /  ALT  20  /  AlkPhos  72  0808    PT/INR - ( 08 Aug 2018 23:24 )   PT: 12.9 sec;   INR: 1.18 ratio         PTT - ( 08 Aug 2018 23:24 )  PTT:33.1 sec  Urinalysis Basic - ( 08 Aug 2018 23:53 )    Color: Yellow / Appearance: Clear / S.015 / pH: x  Gluc: x / Ketone: Negative  / Bili: Negative / Urobili: Negative mg/dL   Blood: x / Protein: Negative mg/dL / Nitrite: Negative   Leuk Esterase: Moderate / RBC: x / WBC 11-25   Sq Epi: x / Non Sq Epi: x / Bacteria: x      CAPILLARY BLOOD GLUCOSE          Culture - Urine (collected 09 Aug 2018 08:28)  Source: .Urine Clean Catch (Midstream)  Final Report (10 Aug 2018 11:55):    Culture grew 3 or more types of organisms which indicate    collection contamination; consider recollection only if clinically    indicated.      RADIOLOGY & ADDITIONAL TESTS:    Imaging Personally Reviewed:  [ ] YES  [ ] NO    Consultant(s) Notes Reviewed:  [ ] YES  [ ] NO    Care Discussed with Consultants/Other Providers [ ] YES  [ ] NO

## 2018-08-10 NOTE — PROGRESS NOTE ADULT - SUBJECTIVE AND OBJECTIVE BOX
ALL NOTES REVIEWED  DR WHEATLEY'S INPUT GREATLY APPRECIATED  TELEMETRY: PACED  NORMAL BI V FUNCTION  ALL LABS/RADIOLOGY/MEDICATIONS REVIEWED;   EUVOLEMIC ON EXAM  NO ACUTE  CARDIAC COMPONENT SUGGESTED AS TO HER PRESENTATION  EQUIVOCAL TROPONIN MARKER(S) SECONDARY TO KNOWN STRUCTURAL HEART DISEASE. NO HISTORY OF CAD ON PREOP MVR CATH  ALLERGIC TO VKA AND RELUCTANT TO USE NOAC OR LMWH RE ATRIAL FIBRILLATION     IF REMAINS STABLE , WOULD ANTICIPATE DISCHARGE TOMORROW WITH FOLLOW UP WITH ME NEXT WEEK    SLICK AVIAN MD, FACC

## 2018-08-10 NOTE — PROGRESS NOTE ADULT - ASSESSMENT
84 yrs old Female PMH HTN, atrial fib, CHF, aortic valve replacement; patient presents to the ED for generalized weakness and syncope.    Syncope:  - Paced rhythm on tele.  - Pacemaker interrogation done, no acute events.  - Unclear etiology of syncope.  - Possibly vaso vagal?  - Echo with EF 35-40%, grade 2 diastolic failure  - Cardio following.    Chronic A fib:  - Rate controlled, has pacemaker.  - On BB.  - Not on AC.    Elevated troponin:  - Likely demand ischemic.    Suspected UTI:  - Continue antibiotic.  - Follow up culture.    DVT ppx:  - On lovenox. 84 yrs old Female PMH HTN, atrial fib, CHF, aortic valve replacement; patient presents to the ED for generalized weakness and syncope.    Syncope:  - Paced rhythm on tele.  - Pacemaker interrogation done, no acute events.  - Unclear etiology of syncope.  - Possibly vaso vagal?  - Echo with EF 35-40%, grade 2 diastolic failure  - Cardio following.    Chronic systolic and diastolic CHF:  - Echo with EF 35-40%, grade 2 diastolic failure  - On ACEI, BB.  - Now asymptomatic, cardio following.    Chronic A fib:  - Rate controlled, has pacemaker.  - On BB.  - Not on AC.    Elevated troponin:  - Likely demand ischemic.    Suspected UTI:  - Continue antibiotic.  - Follow up culture.    DVT ppx:  - On lovenox.

## 2018-08-10 NOTE — PROGRESS NOTE ADULT - ASSESSMENT
85 yo female with H/O chr AF, HTN, PPM/ICD in 2014, s/p MVR, with recurrent, p/w near syncope, then had  witnessed syncopal episode on 8/9/18.  CT head: no acute findings  CT chest: No evidence of aortic dissection, aortic aneurysm or mural hematoma.    Syncope  Chronic atrial fibrillation  HTN    PLAN    -Check for orthostasis  - check TTE for LVEF/valvulat lesions  -AICD interrogated ( [employer]), no reportable events  -Elevated trop likely ischemic demand, doubt significant CAD, trop downward trending   -Continue bbl, unclear why patient not on a/c   -c/w asa/statin  -c/w lisinopril per parameter

## 2018-08-11 ENCOUNTER — TRANSCRIPTION ENCOUNTER (OUTPATIENT)
Age: 83
End: 2018-08-11

## 2018-08-11 VITALS
DIASTOLIC BLOOD PRESSURE: 75 MMHG | SYSTOLIC BLOOD PRESSURE: 129 MMHG | HEART RATE: 75 BPM | RESPIRATION RATE: 20 BRPM | TEMPERATURE: 98 F | OXYGEN SATURATION: 99 %

## 2018-08-11 PROCEDURE — 99239 HOSP IP/OBS DSCHRG MGMT >30: CPT

## 2018-08-11 RX ORDER — ATORVASTATIN CALCIUM 80 MG/1
1 TABLET, FILM COATED ORAL
Qty: 30 | Refills: 0
Start: 2018-08-11 | End: 2018-09-09

## 2018-08-11 RX ORDER — RAMIPRIL 5 MG
1 CAPSULE ORAL
Qty: 30 | Refills: 0
Start: 2018-08-11 | End: 2018-09-09

## 2018-08-11 RX ORDER — CARVEDILOL PHOSPHATE 80 MG/1
1 CAPSULE, EXTENDED RELEASE ORAL
Qty: 60 | Refills: 0
Start: 2018-08-11 | End: 2018-09-09

## 2018-08-11 RX ORDER — ASPIRIN/CALCIUM CARB/MAGNESIUM 324 MG
1 TABLET ORAL
Qty: 30 | Refills: 0
Start: 2018-08-11 | End: 2018-09-09

## 2018-08-11 RX ORDER — CIPROFLOXACIN LACTATE 400MG/40ML
1 VIAL (ML) INTRAVENOUS
Qty: 5 | Refills: 0
Start: 2018-08-11 | End: 2018-08-15

## 2018-08-11 RX ORDER — LISINOPRIL 2.5 MG/1
1 TABLET ORAL
Qty: 30 | Refills: 0
Start: 2018-08-11 | End: 2018-09-09

## 2018-08-11 RX ADMIN — Medication 81 MILLIGRAM(S): at 11:07

## 2018-08-11 RX ADMIN — ENOXAPARIN SODIUM 40 MILLIGRAM(S): 100 INJECTION SUBCUTANEOUS at 11:08

## 2018-08-11 NOTE — DISCHARGE NOTE ADULT - SECONDARY DIAGNOSIS.
Chronic atrial fibrillation Essential hypertension Acute cystitis without hematuria Elevated troponin Chronic combined systolic and diastolic congestive heart failure

## 2018-08-11 NOTE — DISCHARGE NOTE ADULT - HOSPITAL COURSE
84 yrs old Female PMH HTN, atrial fib, CHF, aortic valve replacement; patient presents to the ED for generalized weakness and syncope.    Syncope:  - Paced rhythm on tele.  - Pacemaker interrogation done, no acute events.  - Unclear etiology of syncope.  - Possibly vaso vagal?  - Echo with EF 35-40%, grade 2 diastolic failure  - Cardio following.    Chronic systolic and diastolic CHF:  - Echo with EF 35-40%, grade 2 diastolic failure  - On ACEI, BB.  - Now asymptomatic, cardio following.    Chronic A fib:  - Rate controlled, has pacemaker.  - On BB.  - Not on AC.    Elevated troponin:  - Likely demand ischemic.    Suspected UTI:  - Continue antibiotic.  - culture contaminated, would not repeat as now on antibiotic complete 3 days on dc    DVT ppx:  - On lovenox. 84 yrs old Female PMH HTN, atrial fib, CHF, aortic valve replacement; patient presents to the ED for generalized weakness and syncope.    Syncope:  - Paced rhythm on tele.  - Pacemaker interrogation done, no acute events.  - Unclear etiology of syncope.  - Possibly vaso vagal?  - Echo with EF 35-40%, grade 2 diastolic failure  - Cardio following.    Chronic systolic and diastolic CHF:  - Echo with EF 35-40%, grade 2 diastolic failure  - On ACEI, BB.  - Now asymptomatic, cardio following.    Chronic A fib:  - Rate controlled, has pacemaker.  - On BB.  - Not on AC.    Elevated troponin:  - Likely demand ischemic.    Suspected UTI:  - Continue antibiotic.  - culture contaminated, would not repeat as now on antibiotic complete 3 days on dc    DVT ppx:  - On lovenox.    seen by PT , recommended PT  but pt refused

## 2018-08-11 NOTE — DISCHARGE NOTE ADULT - MEDICATION SUMMARY - MEDICATIONS TO TAKE
I will START or STAY ON the medications listed below when I get home from the hospital:    Aspirin Enteric Coated 81 mg oral delayed release tablet  -- 1 tab(s) by mouth once a day   -- Swallow whole.  Do not crush.  Take with food or milk.    -- Indication: For Chronic atrial fibrillation    ramipril 1.25 mg oral capsule  -- 1 cap(s) by mouth once a day   -- Do not take this drug if you are pregnant.  It is very important that you take or use this exactly as directed.  Do not skip doses or discontinue unless directed by your doctor.  Some non-prescription drugs may aggravate your condition.  Read all labels carefully.  If a warning appears, check with your doctor before taking.    -- Indication: For Essential hypertension    atorvastatin 20 mg oral tablet  -- 1 tab(s) by mouth once a day (at bedtime)   -- Avoid grapefruit and grapefruit juice while taking this medication.  Do not take this drug if you are pregnant.  It is very important that you take or use this exactly as directed.  Do not skip doses or discontinue unless directed by your doctor.  Obtain medical advice before taking any non-prescription drugs as some may affect the action of this medication.  Take with food or milk.    -- Indication: For preventive    carvedilol 6.25 mg oral tablet  -- 1 tab(s) by mouth 2 times a day   -- It is very important that you take or use this exactly as directed.  Do not skip doses or discontinue unless directed by your doctor.  May cause drowsiness.  Alcohol may intensify this effect.  Use care when operating dangerous machinery.  Some non-prescription drugs may aggravate your condition.  Read all labels carefully.  If a warning appears, check with your doctor before taking.  Take with food or milk.    -- Indication: For Essential hypertension    Levaquin 250 mg oral tablet  -- 1 tab(s) by mouth once a day   -- Avoid prolonged or excessive exposure to direct and/or artificial sunlight while taking this medication.  Do not take dairy products, antacids, or iron preparations within one hour of this medication.  Finish all this medication unless otherwise directed by prescriber.  May cause drowsiness or dizziness.  Medication should be taken with plenty of water.    -- Indication: For UTI (urinary tract infection)

## 2018-08-11 NOTE — DISCHARGE NOTE ADULT - PATIENT PORTAL LINK FT
You can access the [facility], offered by [facility], by registering with the following website: [email or web address]

## 2018-08-11 NOTE — DISCHARGE NOTE ADULT - CARE PLAN
Principal Discharge DX:	Syncope, unspecified syncope type  Goal:	resolved  Assessment and plan of treatment:	was evaluated by cardiology, pacemaker interrogation done.  Continue current medication  Needs follow up with Dr. Gordon in 1 wk.  Secondary Diagnosis:	Chronic atrial fibrillation  Assessment and plan of treatment:	Continue current medication  Secondary Diagnosis:	Essential hypertension  Assessment and plan of treatment:	Continue current medication, hold medication if blood pressure < 110  Secondary Diagnosis:	Acute cystitis without hematuria  Assessment and plan of treatment:	Continue levaquin for 3 days  Secondary Diagnosis:	Elevated troponin  Secondary Diagnosis:	Chronic combined systolic and diastolic congestive heart failure  Assessment and plan of treatment:	Continue current medication  Needs follow up with Dr. Gordon in 1 wk.

## 2018-08-11 NOTE — DISCHARGE NOTE ADULT - CARE PROVIDER_API CALL
Richard Gordon), Cardiology; Internal Medicine; Nuclear Cardiology  [address] [place] [address]  Phone: [phone]  Fax: [phone]

## 2018-08-13 LAB — LIDOCAIN SERPL-MCNC: <1 MCG/ML — LOW (ref 1.5–5)

## 2018-08-17 DIAGNOSIS — Z88.0 ALLERGY STATUS TO PENICILLIN: ICD-10-CM

## 2018-08-17 DIAGNOSIS — I48.2 CHRONIC ATRIAL FIBRILLATION: ICD-10-CM

## 2018-08-17 DIAGNOSIS — I11.0 HYPERTENSIVE HEART DISEASE WITH HEART FAILURE: ICD-10-CM

## 2018-08-17 DIAGNOSIS — I24.8 OTHER FORMS OF ACUTE ISCHEMIC HEART DISEASE: ICD-10-CM

## 2018-08-17 DIAGNOSIS — R55 SYNCOPE AND COLLAPSE: ICD-10-CM

## 2018-08-17 DIAGNOSIS — I50.42 CHRONIC COMBINED SYSTOLIC (CONGESTIVE) AND DIASTOLIC (CONGESTIVE) HEART FAILURE: ICD-10-CM

## 2018-08-17 DIAGNOSIS — N30.00 ACUTE CYSTITIS WITHOUT HEMATURIA: ICD-10-CM

## 2018-09-03 PROBLEM — R60.0 BILATERAL EDEMA OF LOWER EXTREMITY: Status: ACTIVE | Noted: 2017-04-12

## 2019-03-02 NOTE — PATIENT PROFILE ADULT. - BLOOD TRANSFUSION, PREVIOUS, PROFILE
FONT COLOR=\"#[id number]\">NADEGE MAN  : 1940  ACCOUNT:  [id number]  [phone]  PCP: Dr. Celestina Qureshi     TODAY'S DATE: 2017  DICTATED BY:  [Yandel Deshpande M.D.  ]    CHIEF COMPLAINT: [Followup of . CAD, established.]    HPI:    [On 2017, Nadege Man, a 76 year old male, presented with no interim cardiac complaints.]    Office followup for this very nice 76-year-old gentleman with a prior history of coronary artery bypass surgery. He had presented in 2013 with unstable angina. At the time of his surgery he had LIMA to the LAD, saphenous vein graft to the diagonal and obtuse marginal. The right coronary artery was too small and too diseased to be bypassed. He has had no symptoms since then. He is getting ready to go to [facility] for three weeks. He is due for lipids. He really looks great here in the office. His daughter is with him and interprets for him and he is not having any problems at all with chest discomfort.    RISK FACTORS:  CAD - Weight  CAD Equivalent - Diabetes    REVIEW OF SYSTEMS:    CONS: no fever, chills, or recent weight changes. EYES: denies significant visual changes. ENMT: denies difficulties with hearing, otherwise negative. CV: see HPI; denies claudication. RESP: denies chronic cough, hemoptysis. GI: denies melena, hematochezia. : no hematuria. INTEG: no new rashes, lesions. MS: no limiting arthritis. NEURO: no localized deficits. HEM/LYMPH: denies easy bruising. ALL: no new food or enviornmental allergies.      PAST HISTORY: DM    PAST CV HISTORY: CABG x 3, 3/13 and dyslipidemia    FAMILY HISTORY: Negative for premature CAD. Negative for AAA.  SOCIAL HISTORY: SMOKING: Former tobacco use. used to smoke but quit. CAFFEINE: 4 tea daily. ALCOHOL: 2 shots daily x many years, (not now). EXERCISE: no regular exercise. DIET: diabetic. MARITAL STATUS: .     ALLERGIES: No Known Allergies    MEDICATIONS: Selected prescriptions see below    VITAL SIGNS: [B/P - 120/70 , Pulse  - 78, Weight -  199, Height -   67 , BMI - 31.2 ]    CONS: well developed, well nourished. WEIGHT: BMI parameters reviewed and discussed. HEAD/FACE: no trauma and normocephalic. EYES: conjunctivae not injected and no xanthelasma. ENT: mucosa pink and moist. NECK: jugular venous pressure not elevated. RESP: respirations with normal rate and rhythm, clear to auscultation. CHEST/BREASTS: healed sternotomy. GI: no masses, tenderness or hepatosplenomegaly, rectal deferred. MS: adequate gait for exercise/testing. EXT: no clubbing or cyanosis.  SKIN: no rashes, lesions, ulcers.  NEURO/PSYCH: alert and oriented to time, place and person and normal affect.      CV: PALP: PMI not displaced, no lifts and thrills or rub. AUSC:  regular rhythm, normal S1, S2 without S3; no pathologic murmurs. CAROTIDS: carotid pulses normal. ABD AORTA: abdominal aorta not enlarged. FEM: femoral pulses intact. PEDAL: pedal pulses intact. EXT: no peripheral edema.     PLAN:    [1. Coronary disease, he is asymptomatic at this particular time.    2. He is due for lipids.    3. He can followup with me on a yearly basis. He is doing quite well at this time.]    PRESCRIPTIONS:   01/18/17 *Losartan Potassium   25MG      1 TABLET BY MOUTH DAILY                  08/26/16 *Atorvastatin Calcium 10MG      1 TABLET DAILY AT BEDTIME.               04/04/13 Ecotrin               325MG     1 TABLET DAILY AS DIRECTED.       Yandel Deshpande M.D.          NORMA/ryan   D: 03/07/2017 - T: 03/16/2017 - Job ID# [id number]  C: Dr. Celestina Qureshi                              yes

## 2021-05-03 ENCOUNTER — EMERGENCY (EMERGENCY)
Facility: HOSPITAL | Age: 86
LOS: 0 days | Discharge: HOME | End: 2021-05-03
Attending: STUDENT IN AN ORGANIZED HEALTH CARE EDUCATION/TRAINING PROGRAM
Payer: MEDICARE

## 2021-05-03 VITALS
DIASTOLIC BLOOD PRESSURE: 60 MMHG | TEMPERATURE: 96 F | HEIGHT: 64 IN | WEIGHT: 110.89 LBS | OXYGEN SATURATION: 99 % | HEART RATE: 88 BPM | RESPIRATION RATE: 19 BRPM | SYSTOLIC BLOOD PRESSURE: 152 MMHG

## 2021-05-03 VITALS
RESPIRATION RATE: 18 BRPM | HEART RATE: 70 BPM | OXYGEN SATURATION: 99 % | DIASTOLIC BLOOD PRESSURE: 80 MMHG | TEMPERATURE: 98 F | SYSTOLIC BLOOD PRESSURE: 138 MMHG

## 2021-05-03 DIAGNOSIS — I48.91 UNSPECIFIED ATRIAL FIBRILLATION: ICD-10-CM

## 2021-05-03 DIAGNOSIS — I10 ESSENTIAL (PRIMARY) HYPERTENSION: ICD-10-CM

## 2021-05-03 DIAGNOSIS — Z95.2 PRESENCE OF PROSTHETIC HEART VALVE: Chronic | ICD-10-CM

## 2021-05-03 DIAGNOSIS — M17.12 UNILATERAL PRIMARY OSTEOARTHRITIS, LEFT KNEE: ICD-10-CM

## 2021-05-03 DIAGNOSIS — R53.1 WEAKNESS: ICD-10-CM

## 2021-05-03 DIAGNOSIS — M19.90 UNSPECIFIED OSTEOARTHRITIS, UNSPECIFIED SITE: ICD-10-CM

## 2021-05-03 DIAGNOSIS — Z88.0 ALLERGY STATUS TO PENICILLIN: ICD-10-CM

## 2021-05-03 LAB
ALBUMIN SERPL ELPH-MCNC: 3.7 G/DL — SIGNIFICANT CHANGE UP (ref 3.3–5)
ALP SERPL-CCNC: 70 U/L — SIGNIFICANT CHANGE UP (ref 40–120)
ALT FLD-CCNC: 21 U/L — SIGNIFICANT CHANGE UP (ref 12–78)
ANION GAP SERPL CALC-SCNC: 5 MMOL/L — SIGNIFICANT CHANGE UP (ref 5–17)
APTT BLD: 29.4 SEC — SIGNIFICANT CHANGE UP (ref 27.5–35.5)
AST SERPL-CCNC: 18 U/L — SIGNIFICANT CHANGE UP (ref 15–37)
BILIRUB SERPL-MCNC: 0.5 MG/DL — SIGNIFICANT CHANGE UP (ref 0.2–1.2)
BUN SERPL-MCNC: 21 MG/DL — SIGNIFICANT CHANGE UP (ref 7–23)
CALCIUM SERPL-MCNC: 9.1 MG/DL — SIGNIFICANT CHANGE UP (ref 8.5–10.1)
CHLORIDE SERPL-SCNC: 107 MMOL/L — SIGNIFICANT CHANGE UP (ref 96–108)
CO2 SERPL-SCNC: 28 MMOL/L — SIGNIFICANT CHANGE UP (ref 22–31)
CREAT SERPL-MCNC: 0.95 MG/DL — SIGNIFICANT CHANGE UP (ref 0.5–1.3)
ERYTHROCYTE [SEDIMENTATION RATE] IN BLOOD: 34 MM/HR — HIGH (ref 0–20)
GLUCOSE SERPL-MCNC: 175 MG/DL — HIGH (ref 70–99)
HCT VFR BLD CALC: 36.5 % — SIGNIFICANT CHANGE UP (ref 34.5–45)
HGB BLD-MCNC: 11.3 G/DL — LOW (ref 11.5–15.5)
INR BLD: 1.22 RATIO — HIGH (ref 0.88–1.16)
MCHC RBC-ENTMCNC: 27.6 PG — SIGNIFICANT CHANGE UP (ref 27–34)
MCHC RBC-ENTMCNC: 31 GM/DL — LOW (ref 32–36)
MCV RBC AUTO: 89 FL — SIGNIFICANT CHANGE UP (ref 80–100)
NRBC # BLD: 0 /100 WBCS — SIGNIFICANT CHANGE UP (ref 0–0)
PLATELET # BLD AUTO: 193 K/UL — SIGNIFICANT CHANGE UP (ref 150–400)
POTASSIUM SERPL-MCNC: 4.3 MMOL/L — SIGNIFICANT CHANGE UP (ref 3.5–5.3)
POTASSIUM SERPL-SCNC: 4.3 MMOL/L — SIGNIFICANT CHANGE UP (ref 3.5–5.3)
PROT SERPL-MCNC: 8.5 GM/DL — HIGH (ref 6–8.3)
PROTHROM AB SERPL-ACNC: 14 SEC — HIGH (ref 10.6–13.6)
RBC # BLD: 4.1 M/UL — SIGNIFICANT CHANGE UP (ref 3.8–5.2)
RBC # FLD: 14.2 % — SIGNIFICANT CHANGE UP (ref 10.3–14.5)
SODIUM SERPL-SCNC: 140 MMOL/L — SIGNIFICANT CHANGE UP (ref 135–145)
WBC # BLD: 8.16 K/UL — SIGNIFICANT CHANGE UP (ref 3.8–10.5)
WBC # FLD AUTO: 8.16 K/UL — SIGNIFICANT CHANGE UP (ref 3.8–10.5)

## 2021-05-03 PROCEDURE — 73502 X-RAY EXAM HIP UNI 2-3 VIEWS: CPT | Mod: 26,LT

## 2021-05-03 PROCEDURE — 73562 X-RAY EXAM OF KNEE 3: CPT | Mod: 26,LT

## 2021-05-03 PROCEDURE — 93010 ELECTROCARDIOGRAM REPORT: CPT

## 2021-05-03 PROCEDURE — 73552 X-RAY EXAM OF FEMUR 2/>: CPT | Mod: 26,LT

## 2021-05-03 PROCEDURE — 99284 EMERGENCY DEPT VISIT MOD MDM: CPT

## 2021-05-03 RX ORDER — IBUPROFEN 200 MG
1 TABLET ORAL
Qty: 20 | Refills: 0
Start: 2021-05-03

## 2021-05-03 RX ORDER — KETOROLAC TROMETHAMINE 30 MG/ML
15 SYRINGE (ML) INJECTION ONCE
Refills: 0 | Status: DISCONTINUED | OUTPATIENT
Start: 2021-05-03 | End: 2021-05-03

## 2021-05-03 RX ORDER — DICLOFENAC SODIUM 30 MG/G
4 GEL TOPICAL
Qty: 100 | Refills: 0
Start: 2021-05-03

## 2021-05-03 RX ORDER — METHOCARBAMOL 500 MG/1
750 TABLET, FILM COATED ORAL ONCE
Refills: 0 | Status: COMPLETED | OUTPATIENT
Start: 2021-05-03 | End: 2021-05-03

## 2021-05-03 RX ADMIN — Medication 15 MILLIGRAM(S): at 15:43

## 2021-05-03 RX ADMIN — METHOCARBAMOL 750 MILLIGRAM(S): 500 TABLET, FILM COATED ORAL at 14:58

## 2021-05-03 RX ADMIN — Medication 15 MILLIGRAM(S): at 14:58

## 2021-05-03 NOTE — ED PROVIDER NOTE - INTERNATIONAL TRAVEL
Ambulatory Surgery/Procedure Discharge Note    Vitals:    07/06/20 1830   BP: (!) 160/82   Pulse: 65   Resp: 15   Temp: 97.7 °F (36.5 °C)   SpO2: 98%   bp within 20% of admin BP    In: 1025 [I.V.:1025]  Out: 1     Restroom use offered before discharge. Yes    Pain assessment:  none  Pain Level: 0        Patient discharged to home/self care.  Patient discharged via wheel chair by transporter to waiting family/S.O.       7/6/2020 6:33 PM
Patient admitted to pacu post FULL THICKNESS SKIN GRAFT TO THE NOSE (2.5 X 2 CM), ROTATIONAL ADVANCEMENT FLAP (3X4.2 CM) TO THE LIP (3X2 CM) with Dr. Miryam Marrero. Patient connected to bedside monitors; VSS. Per CRNA patient was stable intraop. Patient resting comfortably with no complaints.
Limitation of Treatment order in effect during my hospitalization, the order may or may not be in effect during this procedure. I give my doctor permission to give me blood or blood products. I understand that there are risks with receiving blood such as hepatitis, AIDS, fever, or allergic reaction. I acknowledge that the risks, benefits, and alternatives of this treatment have been explained to me and that no express or implied warranty has been given by the hospital, any blood bank, or any person or entity as to the blood or blood components transfused. At the discretion of my doctor, I agree to allow observers, equipment/product representatives and allow photographing, and/or televising of the procedure, provided my name or identity is maintained confidentially. I agree the hospital may dispose of or use for scientific or educational purposes any tissue, fluid, or body parts which may be removed.     ________________________________Date________Time______ am/pm  ([place] [facility])  Patient or Signature of Closest Relative or Legal Guardian    ________________________________Date________Time______am/pm      Page 1 of  1  Witness
No

## 2021-05-03 NOTE — ED PROVIDER NOTE - PROGRESS NOTE DETAILS
d/w ortho, pain completely resovled by toradol, FROM of affected joint, likely arthritis flare as no fever and complete resolution with toradol. will d/c with diclofenac for symptom control. patinet with borderline GFR, motrin sent to pharmacy, though patinet conseled to only take for extreme pain. will follow with ortho outpatient

## 2021-05-03 NOTE — ED PROVIDER NOTE - CARE PROVIDER_API CALL
Adriano Oliveira (DO)  Orthopaedic Surgery  [address] [place] [address]  Phone: [phone]  Fax: [phone]  Follow Up Time:

## 2021-05-03 NOTE — ED ADULT NURSE NOTE - OBJECTIVE STATEMENT
PMH CHF and HTN. Pt c/o that she is unable to ambulate with weakness and generalized left sided pain. At baseline walks with a cane. Able to sit up at the side of the bed. Was sent in by Dr. Gordon. Denies taking any medications prior to arrival for pain. Denies falls and LOC.

## 2021-05-03 NOTE — ED PROVIDER NOTE - MUSCULOSKELETAL, MLM
left knee with mild swelling, ttp on anterior aspect of knee, no calf swelling or ttp, ROM of left knee limited by pain. Spine appears normal, range of motion of other joints is not limited, no other muscle or joint tenderness

## 2021-05-03 NOTE — ED PROVIDER NOTE - CLINICAL SUMMARY MEDICAL DECISION MAKING FREE TEXT BOX
no clinical signs of DVT, xray of leg significant for joint effusion without traumatic pathology. ESR elevated; after toradol administration patient with marked improvement of ROM of knee and now able to ambulate and bear weight on leg. possibility of septic arthritis d/w ortho, pending their evaluation

## 2021-05-03 NOTE — ED PROVIDER NOTE - PATIENT PORTAL LINK FT
You can access the FollowMyHealth Patient Portal offered by [facility] by registering at the following website: [email or web address]. By joining [employer]’s FollowMyHealth portal, you will also be able to view your health information using other applications (apps) compatible with our system.

## 2021-05-03 NOTE — CONSULT NOTE ADULT - SUBJECTIVE AND OBJECTIVE BOX
85yFemale c/o who presents c/o left knee pain over the past few days. Patient states she noticed progressively worsening, atraumatic left knee pain starting several days ago after ambulating for a long period of time. Patient normally ambulates intermittently with a cane/walker, but has been more dependent on her devices since the onset of knee pain. Patient denies fevers/chills at home. Denies lower extremity numbness/tingling/parathesia's. Denies falls/injuries. Patient states she has never followed up with an orthopedic surgeon before, but describes a history of chronic left knee pain/creptitus.     PAST MEDICAL & SURGICAL HISTORY:  Hypertension    Chronic atrial fibrillation    Aortic valve replaced      Home Medications:    Allergies    penicillins (Anaphylaxis)    Intolerances      Vital Signs Last 24 Hrs  T(C): 36.7 (03 May 2021 16:51), Max: 36.7 (03 May 2021 16:51)  T(F): 98 (03 May 2021 16:51), Max: 98 (03 May 2021 16:51)  HR: 77 (03 May 2021 16:51) (77 - 88)  BP: 144/79 (03 May 2021 16:51) (144/79 - 152/60)  BP(mean): --  RR: 18 (03 May 2021 16:51) (18 - 19)  SpO2: 98% (03 May 2021 16:51) (98% - 99%)    PE  Gen: NAD, resting comfortably  LLE:   Skin intact, no erythema or drainage, no warmth, no palpable effusion  TTP over knee around the joint line, no fluctuance  PROM intact with mild pain 0-100, no pain on micromotion  +TA/EHL/FHL/GS  SILT L2-S1  DP/PT 2+  Compartments soft and compressible    Radiology  XR L Knee demonstrating moderate arthritis, small effusion, no fractures.     A/P 85yFemale with worsening left knee pain over the past several days    - Low concern for septic joint based on clinical examination/imaging, likely OA versus inflammatory arthritis flare   - Given Toradol in the ED with reported large improvement in symptoms consistent with arthritis flare   - Mutlimodal analgesia, recommend NSAIDS pending renal function  - WBAT  - PT, recommend ambulating trial in ED  - Patient would likely benefit from outpatient physical therapy  - Recommend DVT prophylaxis if non-ambulatory  - ESR 34, can be mildly elevated with flares  - Discussed DJD management with patient. All questions answered  - Orthopedically stable for d/c  - Patient may follow-up with Dr. Oliveira in 2 weeks. Please call office to schedule an appt  - Will antonia attending

## 2021-05-04 RX ORDER — CARVEDILOL PHOSPHATE 80 MG/1
0 CAPSULE, EXTENDED RELEASE ORAL
Qty: 0 | Refills: 0 | DISCHARGE

## 2021-06-02 NOTE — ED ADULT NURSE NOTE - NSHOSCREENINGQ1_ED_ALL_ED
Controlled Substance Refill Request  Medication Name:   Requested Prescriptions     Pending Prescriptions Disp Refills     oxyCODONE-acetaminophen (PERCOCET) 5-325 mg per tablet 180 tablet 0     Sig: Take 1-2 tablets by mouth every 6 (six) hours as needed for pain (maximum of 6 tablets per day).     Date Last Fill: 9/26/19  Pharmacy:[facility]      Submit electronically to pharmacy  Controlled Substance Agreement Date Scanned:   Encounter-Level CSA Scan Date:    There are no encounter-level csa scan date.       Last office visit with prescriber/PCP: 8/21/2019 Marichuy Rubio FNP OR same dept: 10/8/2019 Ha Castillo MD OR same specialty: 10/8/2019 Ha Castillo MD  Last physical: 4/15/2019 Last MTM visit: Visit date not found      Patient reports she has 1 day left of medication. Please send a new prescription to her pharmacy asap!   No

## 2021-09-15 NOTE — PATIENT PROFILE ADULT. - MEDICATIONS BROUGHT TO HOSPITAL, PROFILE
Diabetes Self-Management Education Record    Participant Name: Ludy Rosenbaum  Referring Provider: Alan Witt DO  Assessment/Evaluation Ratings:  1=Needs Instruction   4=Demonstrates Understanding/Competency  2=Needs Review   NC=Not Covered    3=Comprehends Key Points  N/A=Not Applicable  Topics/Learning Objectives Pre-session Assess Date:  Instructor initials/date  9/15/21[facility] Instr. Date    Instructor initials/date Follow-up Post- session Eval Comments   Diabetes disease process & Treatment process:   -Define type of diabetes in simple terms.  - Describe the ABCs of  diabetes management  -Identify own type of diabetes  -Identify lifestyle changes/treatment options  -other:  2 [] All     []  []  []  []  []   9/15/21[facility]: Type 2 dm; dx 2020. Developing strategies for Healthy coping/psychosocial issues:    -Describe feelings about living with diabetes  -Identify coping strategies and sources of stress  -Identify support needed & support network available  -Complete PAID-5 Diabetes questionnaire 2 [] All     []  []  []    []      Prevention, detection & treatment of Chronic complications:    -Identify the prevention, detection and treatment for complications including immunizations, preventive eye, foot, dental and renal exams as indicated per the participant's duration of diabetes and health status.  -Define the natural course of diabetes and the relationship of blood glucose levels to long term complications of diabetes.   2 [] All     []            []      Prevention, detection & treatment of acute complications:    -State the causes,signs & symptoms of hyper & hypoglycemia, and prevention & treatment strategies.   -Describe sick day guidelines  DKA /indications for ketone testing &  when to call physician  2 [] All     []      []        -Identify severe weather/situation crisis  & diabetes supplies management  []      Using medications safely:   -State effects of diabetes medicines on blood glucose levels;  -List diabetes medication taken, action & side effects 1 [] All     []  []   9/15/21[facility]: Metformin 1000 mg BID, Januvia 50mg qd, Glipizide 10mg BID   Insulin/Injectables/glucagon  -Name appropriate injection sites; proper storage; supplies needed;     []       Demonstrate proper technique  []      Monitoring blood glucose, interpreting and using results:   -Identify the purpose of testing   -Identify recommended & personal blood glucose targets & HgbA1C target levels  -State the Importance of logging blood glucose levels for pattern recognition;   -State benefits of reading/using pt generated health data  -Verbalize safe lancet disposal 2 [] All     []  []    []  []  []      -Demonstrate proper testing technique  []      Incorporating physical activity into lifestyle:   -State effect of exercise on blood glucose levels;   -State benefits of regular exercise;   -Define safety considerations/food choices if needed.  -Describe contraindications/maintenance of activity. 2 [] All     []  []    []  []   9/15/21[facility]: Exercises 60 minutes 3x week   Incorporating nutritional management into lifestyle:   -Describe effect of type, amount & timing of food on blood glucose  -Describe methods for preparing and planning healthy meals  -Correctly read food labels  -Name 3 foods high in Carbohydrate 2 [] All       []    []    []  []      -Plan a carbohydrate-controlled meal based on individualized meal plan  -Demonstrate CHO counting/portion control   []  []      Developing strategies for problem solving to promote health/change behavior. -Identify 7 self-care behaviors; Personal health risk factors; Benefits, challenges & strategies for behavioral change and set an individualized goal selection.  2       []     []Nutrition  []Monitoring  []Exercise  []Medication  []Other     Identified Barriers to learning/adherence to self management plan:    None  []  other    Instruction Method:  Lecture/Discussion and Handouts    Supporting Education Materials/Equipment Provided: Self-management manual and Nutritional Packet   []Y[employer]ut materials       [] services     []Other:      Encounter Type Date Attended Start Time End Time Comments No Show Dates   Assessment          Session 1 9/15/21PC 900 36 In person    Session 2        1:1 DSMES          In person Follow-up         Gestational Diabetes         Individual MNT        Meter Instrx        Insulin Instrx           Additional Comments: [] Pt seen individually due to Covid-19 Safety precautions and no group session available.         Date:   Follow-up goal attainment based on patients initial DSMES goal    Dr Notified by [] EMR []Fax        []Post class Hgb A1C  []Medication compliance   []Plate method/meal plan compliance   []Able to state the number of Carbohydrate servings eaten at B,L,D   []Testing blood glucose as prescribed by PCP   []Exercise Routine   []Other:   []Other:     []Patient lost to follow-up  Dr Notified by []EMR []Fax     Personal Support Plan:      [] Keep all scheduled doctor appointments   [] Make and keep appointments with specialists (foot, eye, dentist) as recommended   [] Consult my pharmacist about all new medications or to ask any medication questions   [] Get tested for sleep apnea   [] Seek help for:   [] Make an appointment with:   [] Attend smoking cessation classes or call 1-800-QUIT-NOW  [] Attend Diabetes Support Group   [] Use diabetes magazines, books, or credible web-sites like the ADA for more information  [] Increase exercise at home or join an exercise program:   [] Other: no

## 2022-01-31 NOTE — DISCHARGE NOTE ADULT - PLAN OF CARE
Patient  Would be new was seen at [facility] er 1-28-22 had heavy bleeding was  soak thru many pads and had clots and pain pelvic was told to call our office please call cell    resolved was evaluated by cardiology, pacemaker interrogation done.  Continue current medication  Needs follow up with Dr. Gordon in 1 wk. Continue current medication Continue current medication, hold medication if blood pressure < 110 Continue levaquin for 3 days Continue current medication  Needs follow up with Dr. Gordon in 1 wk.